# Patient Record
Sex: FEMALE | Race: WHITE
[De-identification: names, ages, dates, MRNs, and addresses within clinical notes are randomized per-mention and may not be internally consistent; named-entity substitution may affect disease eponyms.]

---

## 2018-05-15 ENCOUNTER — HOSPITAL ENCOUNTER (OUTPATIENT)
Dept: HOSPITAL 17 - HRAD | Age: 73
Discharge: HOME | End: 2018-05-15
Attending: ORTHOPAEDIC SURGERY
Payer: COMMERCIAL

## 2018-05-15 VITALS
DIASTOLIC BLOOD PRESSURE: 76 MMHG | OXYGEN SATURATION: 96 % | HEART RATE: 94 BPM | TEMPERATURE: 97.8 F | SYSTOLIC BLOOD PRESSURE: 128 MMHG | RESPIRATION RATE: 16 BRPM

## 2018-05-15 VITALS
TEMPERATURE: 97.8 F | RESPIRATION RATE: 16 BRPM | DIASTOLIC BLOOD PRESSURE: 83 MMHG | HEART RATE: 90 BPM | OXYGEN SATURATION: 100 % | SYSTOLIC BLOOD PRESSURE: 135 MMHG

## 2018-05-15 DIAGNOSIS — M19.90: ICD-10-CM

## 2018-05-15 DIAGNOSIS — J44.9: ICD-10-CM

## 2018-05-15 DIAGNOSIS — M71.21: Primary | ICD-10-CM

## 2018-05-15 PROCEDURE — 76942 ECHO GUIDE FOR BIOPSY: CPT

## 2018-05-15 PROCEDURE — 10160 PNXR ASPIR ABSC HMTMA BULLA: CPT

## 2018-05-15 NOTE — RADRPT
EXAM DATE/TIME:  05/15/2018 13:08 

 

[place] COMPARISON:     

 

EXTERNAL COMPARISON:  

US LEG LEFT VENOUS DOPPLER, July 12, 2015, 18:06.  [facility].  MR Knee; right, May 3 2018

 

 

INDICATIONS :      

Baker's cyst right knee.

                 

                 

 

 

 

MEDICAL HISTORY :        

Arthritis.  COPD.  Asthma.  Emphysema.

 

SURGICAL HISTORY :        

Ankle and foot surgery.  Appendectomy.  Hysterectomy.

 

ENCOUNTER:     

Initial

 

ACUITY:     

1 week

 

PAIN SCORE:     

2/10

 

LOCATION:     

Right knee.

                 

 

FLUID:

Total volume of 20 cc of clear, yellow fluid was removed.

Fluid was discarded. 

Post procedure scanning reveals no hematoma or other complication.

 

 

TECHNIQUE:  

1. Ultrasound guidance for needle aspiration.

2. Aspiration.

 

The risks, benefits and alternatives to the procedure were explained and verbal and written consent w
as obtained.  The site was prepped in sterile fashion.  Full sterile technique was used, including ca
p, mask, sterile gloves and gown and a large sterile sheet.  Hand hygiene and 2% chlorhexidine and/or
 betadine/alcohol prep was utilized per protocol for cutaneous antisepsis.  The skin and subcutaneous
 tissues were infiltrated with local anesthetic solution.  Sterile gel and sterile probe cover were u
tilized for ultrasound guidance.

 

With the patient on the ultrasound table, ultrasound imaging was used to select the most appropriate 
approach for aspiration.  A dermatotomy was made with an 11 blade scalpel.  A catheter was introduced
 into the cavity and fluid was collected.   

 

CONCLUSION:     

Uncomplicated ultrasound guided aspiration.  

 

 

 

 Pierce Kruse MD on May 15, 2018 at 16:28           

Board Certified Radiologist.

 This report was verified electronically.